# Patient Record
Sex: FEMALE | Race: WHITE | NOT HISPANIC OR LATINO
[De-identification: names, ages, dates, MRNs, and addresses within clinical notes are randomized per-mention and may not be internally consistent; named-entity substitution may affect disease eponyms.]

---

## 2021-03-09 PROBLEM — Z00.00 ENCOUNTER FOR PREVENTIVE HEALTH EXAMINATION: Status: ACTIVE | Noted: 2021-03-09

## 2021-03-17 ENCOUNTER — APPOINTMENT (OUTPATIENT)
Dept: ANTEPARTUM | Facility: CLINIC | Age: 26
End: 2021-03-17
Payer: COMMERCIAL

## 2021-03-17 ENCOUNTER — ASOB RESULT (OUTPATIENT)
Age: 26
End: 2021-03-17

## 2021-03-17 PROCEDURE — 99072 ADDL SUPL MATRL&STAF TM PHE: CPT

## 2021-03-17 PROCEDURE — 36416 COLLJ CAPILLARY BLOOD SPEC: CPT

## 2021-03-17 PROCEDURE — 76813 OB US NUCHAL MEAS 1 GEST: CPT

## 2021-03-17 PROCEDURE — 76801 OB US < 14 WKS SINGLE FETUS: CPT

## 2021-03-18 ENCOUNTER — TRANSCRIPTION ENCOUNTER (OUTPATIENT)
Age: 26
End: 2021-03-18

## 2021-03-25 ENCOUNTER — EMERGENCY (EMERGENCY)
Facility: HOSPITAL | Age: 26
LOS: 1 days | Discharge: HOME | End: 2021-03-25
Attending: EMERGENCY MEDICINE
Payer: COMMERCIAL

## 2021-03-25 VITALS
SYSTOLIC BLOOD PRESSURE: 144 MMHG | TEMPERATURE: 98 F | RESPIRATION RATE: 16 BRPM | OXYGEN SATURATION: 98 % | WEIGHT: 164.02 LBS | DIASTOLIC BLOOD PRESSURE: 81 MMHG | HEART RATE: 94 BPM

## 2021-03-25 LAB
ALBUMIN SERPL ELPH-MCNC: 3.6 G/DL — SIGNIFICANT CHANGE UP (ref 3.5–5)
ALLERGY+IMMUNOLOGY DIAG STUDY NOTE: SIGNIFICANT CHANGE UP
ALP SERPL-CCNC: 54 U/L — SIGNIFICANT CHANGE UP (ref 40–120)
ALT FLD-CCNC: 39 U/L DA — SIGNIFICANT CHANGE UP (ref 10–60)
ANION GAP SERPL CALC-SCNC: 10 MMOL/L — SIGNIFICANT CHANGE UP (ref 5–17)
APPEARANCE UR: CLEAR — SIGNIFICANT CHANGE UP
AST SERPL-CCNC: 24 U/L — SIGNIFICANT CHANGE UP (ref 10–40)
BACTERIA # UR AUTO: ABNORMAL /HPF
BILIRUB SERPL-MCNC: 0.5 MG/DL — SIGNIFICANT CHANGE UP (ref 0.2–1.2)
BILIRUB UR-MCNC: NEGATIVE — SIGNIFICANT CHANGE UP
BLD GP AB SCN SERPL QL: SIGNIFICANT CHANGE UP
BUN SERPL-MCNC: 6 MG/DL — LOW (ref 7–18)
CALCIUM SERPL-MCNC: 9.2 MG/DL — SIGNIFICANT CHANGE UP (ref 8.4–10.5)
CHLORIDE SERPL-SCNC: 104 MMOL/L — SIGNIFICANT CHANGE UP (ref 96–108)
CO2 SERPL-SCNC: 23 MMOL/L — SIGNIFICANT CHANGE UP (ref 22–31)
COLOR SPEC: YELLOW — SIGNIFICANT CHANGE UP
CREAT SERPL-MCNC: 0.47 MG/DL — LOW (ref 0.5–1.3)
DIFF PNL FLD: ABNORMAL
EPI CELLS # UR: SIGNIFICANT CHANGE UP /HPF
GLUCOSE SERPL-MCNC: 75 MG/DL — SIGNIFICANT CHANGE UP (ref 70–99)
GLUCOSE UR QL: NEGATIVE — SIGNIFICANT CHANGE UP
HCG SERPL-ACNC: HIGH MIU/ML
HCT VFR BLD CALC: 35 % — SIGNIFICANT CHANGE UP (ref 34.5–45)
HGB BLD-MCNC: 12 G/DL — SIGNIFICANT CHANGE UP (ref 11.5–15.5)
KETONES UR-MCNC: ABNORMAL
LEUKOCYTE ESTERASE UR-ACNC: NEGATIVE — SIGNIFICANT CHANGE UP
MCHC RBC-ENTMCNC: 28.2 PG — SIGNIFICANT CHANGE UP (ref 27–34)
MCHC RBC-ENTMCNC: 34.3 GM/DL — SIGNIFICANT CHANGE UP (ref 32–36)
MCV RBC AUTO: 82.4 FL — SIGNIFICANT CHANGE UP (ref 80–100)
NITRITE UR-MCNC: NEGATIVE — SIGNIFICANT CHANGE UP
NRBC # BLD: 0 /100 WBCS — SIGNIFICANT CHANGE UP (ref 0–0)
PH UR: 5 — SIGNIFICANT CHANGE UP (ref 5–8)
PLATELET # BLD AUTO: 201 K/UL — SIGNIFICANT CHANGE UP (ref 150–400)
POTASSIUM SERPL-MCNC: 4.1 MMOL/L — SIGNIFICANT CHANGE UP (ref 3.5–5.3)
POTASSIUM SERPL-SCNC: 4.1 MMOL/L — SIGNIFICANT CHANGE UP (ref 3.5–5.3)
PROT SERPL-MCNC: 7.2 G/DL — SIGNIFICANT CHANGE UP (ref 6–8.3)
PROT UR-MCNC: NEGATIVE — SIGNIFICANT CHANGE UP
RBC # BLD: 4.25 M/UL — SIGNIFICANT CHANGE UP (ref 3.8–5.2)
RBC # FLD: 14.1 % — SIGNIFICANT CHANGE UP (ref 10.3–14.5)
RBC CASTS # UR COMP ASSIST: ABNORMAL /HPF (ref 0–2)
SODIUM SERPL-SCNC: 137 MMOL/L — SIGNIFICANT CHANGE UP (ref 135–145)
SP GR SPEC: 1.02 — SIGNIFICANT CHANGE UP (ref 1.01–1.02)
UROBILINOGEN FLD QL: NEGATIVE — SIGNIFICANT CHANGE UP
WBC # BLD: 11.38 K/UL — HIGH (ref 3.8–10.5)
WBC # FLD AUTO: 11.38 K/UL — HIGH (ref 3.8–10.5)
WBC UR QL: SIGNIFICANT CHANGE UP /HPF (ref 0–5)

## 2021-03-25 PROCEDURE — 84702 CHORIONIC GONADOTROPIN TEST: CPT

## 2021-03-25 PROCEDURE — 76856 US EXAM PELVIC COMPLETE: CPT | Mod: 26

## 2021-03-25 PROCEDURE — 80053 COMPREHEN METABOLIC PANEL: CPT

## 2021-03-25 PROCEDURE — 86870 RBC ANTIBODY IDENTIFICATION: CPT

## 2021-03-25 PROCEDURE — 76856 US EXAM PELVIC COMPLETE: CPT

## 2021-03-25 PROCEDURE — 36415 COLL VENOUS BLD VENIPUNCTURE: CPT

## 2021-03-25 PROCEDURE — 87591 N.GONORRHOEAE DNA AMP PROB: CPT

## 2021-03-25 PROCEDURE — 86850 RBC ANTIBODY SCREEN: CPT

## 2021-03-25 PROCEDURE — 81001 URINALYSIS AUTO W/SCOPE: CPT

## 2021-03-25 PROCEDURE — 86900 BLOOD TYPING SEROLOGIC ABO: CPT

## 2021-03-25 PROCEDURE — 87491 CHLMYD TRACH DNA AMP PROBE: CPT

## 2021-03-25 PROCEDURE — 86901 BLOOD TYPING SEROLOGIC RH(D): CPT

## 2021-03-25 PROCEDURE — 76830 TRANSVAGINAL US NON-OB: CPT | Mod: 26

## 2021-03-25 PROCEDURE — 76830 TRANSVAGINAL US NON-OB: CPT

## 2021-03-25 PROCEDURE — 85027 COMPLETE CBC AUTOMATED: CPT

## 2021-03-25 PROCEDURE — 99284 EMERGENCY DEPT VISIT MOD MDM: CPT | Mod: 25

## 2021-03-25 PROCEDURE — 99285 EMERGENCY DEPT VISIT HI MDM: CPT

## 2021-03-25 NOTE — ED ADULT NURSE NOTE - OBJECTIVE STATEMENT
As per pt, c/o green vaginal discharge, foul urine odor, RLQ pain radiating laterally down the R leg and R side of lower back pain x1 week worsening today. Pt denies any traumas, h/a, SOB, CP, f/c, n/v/d, no vaginal bleeding, cough. Patient admits to being 14 weeks pregnant.

## 2021-03-25 NOTE — ED PROVIDER NOTE - OBJECTIVE STATEMENT
26 y/o F pt 14 weeks pregnant, sent to ED by GYN for increasing RLQ abdominal pain, 5-6/10 in severity, for the past 24hrs. Patient is also c/o burning on urination and green vaginal discharge. Denies fever, chills, diarrhea or any other complaints.

## 2021-03-25 NOTE — ED PROVIDER NOTE - CLINICAL SUMMARY MEDICAL DECISION MAKING FREE TEXT BOX
Pregnancy with RLQ abdominal pain. Will r/o appendicitis vs UTI vs PID. Will perform basic labs, type and cross, US and reevaluate. Pregnancy with RLQ abdominal pain. Will r/o appendicitis vs UTI vs PID. Will perform basic labs, type and cross, US and reevaluate.pt has appointment with gyn tommorow

## 2021-03-25 NOTE — ED ADULT TRIAGE NOTE - CHIEF COMPLAINT QUOTE
patient complains of RLQ pain x couple of days, patient is 14 week pregnant, patient denies vag bleed

## 2021-03-25 NOTE — ED PROVIDER NOTE - PATIENT PORTAL LINK FT
You can access the FollowMyHealth Patient Portal offered by [facility] by registering at the following website: [email or web address]. By joining [employer]’s FollowMyHealth portal, you will also be able to view your health information using other applications (apps) compatible with our system.

## 2021-03-26 LAB
C TRACH RRNA SPEC QL NAA+PROBE: SIGNIFICANT CHANGE UP
N GONORRHOEA RRNA SPEC QL NAA+PROBE: SIGNIFICANT CHANGE UP

## 2021-05-04 ENCOUNTER — EMERGENCY (EMERGENCY)
Facility: HOSPITAL | Age: 26
LOS: 1 days | Discharge: HOME | End: 2021-05-04
Attending: EMERGENCY MEDICINE
Payer: COMMERCIAL

## 2021-05-04 VITALS
TEMPERATURE: 98 F | HEART RATE: 66 BPM | DIASTOLIC BLOOD PRESSURE: 80 MMHG | RESPIRATION RATE: 20 BRPM | SYSTOLIC BLOOD PRESSURE: 115 MMHG | OXYGEN SATURATION: 100 %

## 2021-05-04 VITALS
SYSTOLIC BLOOD PRESSURE: 116 MMHG | HEART RATE: 83 BPM | RESPIRATION RATE: 16 BRPM | OXYGEN SATURATION: 100 % | TEMPERATURE: 97 F | DIASTOLIC BLOOD PRESSURE: 77 MMHG | WEIGHT: 160.06 LBS | HEIGHT: 65 IN

## 2021-05-04 LAB
ALLERGY+IMMUNOLOGY DIAG STUDY NOTE: SIGNIFICANT CHANGE UP
APPEARANCE UR: CLEAR — SIGNIFICANT CHANGE UP
BACTERIA # UR AUTO: ABNORMAL /HPF
BILIRUB UR-MCNC: NEGATIVE — SIGNIFICANT CHANGE UP
BLD GP AB SCN SERPL QL: SIGNIFICANT CHANGE UP
COLOR SPEC: YELLOW — SIGNIFICANT CHANGE UP
DIFF PNL FLD: ABNORMAL
EPI CELLS # UR: SIGNIFICANT CHANGE UP /HPF
GLUCOSE UR QL: NEGATIVE — SIGNIFICANT CHANGE UP
KETONES UR-MCNC: ABNORMAL
LEUKOCYTE ESTERASE UR-ACNC: ABNORMAL
NITRITE UR-MCNC: NEGATIVE — SIGNIFICANT CHANGE UP
PH UR: 7 — SIGNIFICANT CHANGE UP (ref 5–8)
PROT UR-MCNC: NEGATIVE — SIGNIFICANT CHANGE UP
RBC CASTS # UR COMP ASSIST: ABNORMAL /HPF (ref 0–2)
SP GR SPEC: 1 — LOW (ref 1.01–1.02)
UROBILINOGEN FLD QL: NEGATIVE — SIGNIFICANT CHANGE UP
WBC UR QL: SIGNIFICANT CHANGE UP /HPF (ref 0–5)

## 2021-05-04 PROCEDURE — 76805 OB US >/= 14 WKS SNGL FETUS: CPT

## 2021-05-04 PROCEDURE — 87086 URINE CULTURE/COLONY COUNT: CPT

## 2021-05-04 PROCEDURE — 86901 BLOOD TYPING SEROLOGIC RH(D): CPT

## 2021-05-04 PROCEDURE — 99284 EMERGENCY DEPT VISIT MOD MDM: CPT | Mod: 25

## 2021-05-04 PROCEDURE — 86850 RBC ANTIBODY SCREEN: CPT

## 2021-05-04 PROCEDURE — 76805 OB US >/= 14 WKS SNGL FETUS: CPT | Mod: 26

## 2021-05-04 PROCEDURE — 36415 COLL VENOUS BLD VENIPUNCTURE: CPT

## 2021-05-04 PROCEDURE — 99284 EMERGENCY DEPT VISIT MOD MDM: CPT

## 2021-05-04 PROCEDURE — 86870 RBC ANTIBODY IDENTIFICATION: CPT

## 2021-05-04 PROCEDURE — 86900 BLOOD TYPING SEROLOGIC ABO: CPT

## 2021-05-04 PROCEDURE — 81001 URINALYSIS AUTO W/SCOPE: CPT

## 2021-05-04 RX ORDER — SODIUM CHLORIDE 9 MG/ML
1000 INJECTION INTRAMUSCULAR; INTRAVENOUS; SUBCUTANEOUS ONCE
Refills: 0 | Status: DISCONTINUED | OUTPATIENT
Start: 2021-05-04 | End: 2021-05-04

## 2021-05-04 NOTE — ED PROVIDER NOTE - CLINICAL SUMMARY MEDICAL DECISION MAKING FREE TEXT BOX
26 y/o F pt presents to the ED with c/o 19w pregnancy and abd pain. Will assess for viability, cervical length, urine to assess for infection and reassess.

## 2021-05-04 NOTE — ED ADULT TRIAGE NOTE - CHIEF COMPLAINT QUOTE
19 Weeks pregnant with Lower abd pain since 2 pm today ,reports pain subsided now ,denied any vaginal bleeding

## 2021-05-04 NOTE — ED PROVIDER NOTE - ATTENDING CONTRIBUTION TO CARE
I was physically present for the E/M service provided. I agree with above history, physical, and plan which I have reviewed and edited where appropriate. I was physically present for the key portions of the service provided.    [place]: 24 y/o F patient 19w pregnant with no significant PMHx presents to the ED with c/o suprapubic cramping pain 1hr prior to arrival that is now resolved that was radiating to the lower back. denies any vag bleed, trauma    well appearing, abd s/nt/nd    a/p: r/o uti vs abd cramping due to pregnancy- labs, ua, sono, re-assess

## 2021-05-04 NOTE — ED PROVIDER NOTE - PATIENT PORTAL LINK FT
You can access the FollowMyHealth Patient Portal offered by [facility] by registering at the following website: [email or web address]. By joining [employer]’s FollowMyHealth portal, you will also be able to view your health information using other applications (apps) compatible with our system.

## 2021-05-04 NOTE — ED PROVIDER NOTE - NSFOLLOWUPINSTRUCTIONS_ED_ALL_ED_FT
Follow up with your OBGYN within 2-3 days.  If you experience any new or worsening symptoms or if you are concerned you can always come back to the emergency for a re-evaluation.

## 2021-05-04 NOTE — ED ADULT NURSE NOTE - NSIMPLEMENTINTERV_GEN_ALL_ED
Implemented All Universal Safety Interventions:  [place] to call system. Call bell, personal items and telephone within reach. Instruct patient to call for assistance. Room bathroom lighting operational. Non-slip footwear when patient is off stretcher. Physically safe environment: no spills, clutter or unnecessary equipment. Stretcher in lowest position, wheels locked, appropriate side rails in place.

## 2021-05-04 NOTE — ED PROVIDER NOTE - OBJECTIVE STATEMENT
24 y/o F patient 19w pregnant with no significant PMHx presents to the ED with c/o suprapubic cramping pain 1hr prior to arrival that is now resolved that was radiating to the lower back. Patient denies any recent injury, no unusual vaginal discharge, vaginal bleeding or any other complaints.

## 2021-05-04 NOTE — ED PROVIDER NOTE - PROGRESS NOTE DETAILS
UA shows blood, patient is Rh-, received Rhogam. US WNL. UA not suggestive of UTI. Culture sent. Will dc with OB follow up within 2-3 days. Pt is well appearing walking with steady gait, stable for discharge and follow up without fail with medical doctor. I had a detailed discussion with the patient and/or guardian regarding the historical points, exam findings, and any diagnostic results supporting the discharge diagnosis. Pt educated on care and need for follow up. Strict return instructions and red flag signs and symptoms discussed with patient. Questions answered. Pt shows understanding of discharge information and agrees to follow.

## 2021-05-05 LAB
CULTURE RESULTS: SIGNIFICANT CHANGE UP
SPECIMEN SOURCE: SIGNIFICANT CHANGE UP

## 2021-05-10 ENCOUNTER — OUTPATIENT (OUTPATIENT)
Dept: OUTPATIENT SERVICES | Facility: HOSPITAL | Age: 26
LOS: 1 days | End: 2021-05-10
Payer: COMMERCIAL

## 2021-05-10 DIAGNOSIS — Z3A.00 WEEKS OF GESTATION OF PREGNANCY NOT SPECIFIED: ICD-10-CM

## 2021-05-10 DIAGNOSIS — O26.899 OTHER SPECIFIED PREGNANCY RELATED CONDITIONS, UNSPECIFIED TRIMESTER: ICD-10-CM

## 2021-05-10 LAB
APPEARANCE UR: CLEAR — SIGNIFICANT CHANGE UP
BILIRUB UR-MCNC: NEGATIVE — SIGNIFICANT CHANGE UP
COLOR SPEC: SIGNIFICANT CHANGE UP
DIFF PNL FLD: NEGATIVE — SIGNIFICANT CHANGE UP
GLUCOSE UR QL: NEGATIVE — SIGNIFICANT CHANGE UP
KETONES UR-MCNC: NEGATIVE — SIGNIFICANT CHANGE UP
LEUKOCYTE ESTERASE UR-ACNC: NEGATIVE — SIGNIFICANT CHANGE UP
NITRITE UR-MCNC: NEGATIVE — SIGNIFICANT CHANGE UP
PH UR: 7 — SIGNIFICANT CHANGE UP (ref 5–8)
PROT UR-MCNC: NEGATIVE — SIGNIFICANT CHANGE UP
SP GR SPEC: 1.01 — SIGNIFICANT CHANGE UP (ref 1.01–1.02)
UROBILINOGEN FLD QL: NEGATIVE — SIGNIFICANT CHANGE UP

## 2021-05-10 PROCEDURE — 59025 FETAL NON-STRESS TEST: CPT

## 2021-05-10 PROCEDURE — G0463: CPT

## 2021-05-10 PROCEDURE — 76805 OB US >/= 14 WKS SNGL FETUS: CPT | Mod: 26

## 2021-05-10 PROCEDURE — 76815 OB US LIMITED FETUS(S): CPT

## 2021-05-10 PROCEDURE — 76830 TRANSVAGINAL US NON-OB: CPT

## 2021-05-10 PROCEDURE — 76817 TRANSVAGINAL US OBSTETRIC: CPT | Mod: 26

## 2021-05-10 PROCEDURE — 81003 URINALYSIS AUTO W/O SCOPE: CPT

## 2021-05-10 PROCEDURE — 99283 EMERGENCY DEPT VISIT LOW MDM: CPT

## 2021-05-13 ENCOUNTER — APPOINTMENT (OUTPATIENT)
Dept: ANTEPARTUM | Facility: CLINIC | Age: 26
End: 2021-05-13
Payer: COMMERCIAL

## 2021-05-13 PROCEDURE — 76811 OB US DETAILED SNGL FETUS: CPT

## 2021-05-13 PROCEDURE — 99072 ADDL SUPL MATRL&STAF TM PHE: CPT

## 2021-09-23 ENCOUNTER — OUTPATIENT (OUTPATIENT)
Dept: OUTPATIENT SERVICES | Facility: HOSPITAL | Age: 26
LOS: 1 days | End: 2021-09-23
Payer: COMMERCIAL

## 2021-09-23 DIAGNOSIS — O26.899 OTHER SPECIFIED PREGNANCY RELATED CONDITIONS, UNSPECIFIED TRIMESTER: ICD-10-CM

## 2021-09-23 DIAGNOSIS — Z3A.00 WEEKS OF GESTATION OF PREGNANCY NOT SPECIFIED: ICD-10-CM

## 2021-09-23 PROBLEM — Z78.9 OTHER SPECIFIED HEALTH STATUS: Chronic | Status: ACTIVE | Noted: 2021-05-04

## 2021-09-23 PROCEDURE — 59025 FETAL NON-STRESS TEST: CPT

## 2021-09-23 PROCEDURE — G0463: CPT

## 2021-09-24 ENCOUNTER — INPATIENT (INPATIENT)
Facility: HOSPITAL | Age: 26
LOS: 1 days | Discharge: HOME | End: 2021-09-26
Attending: OBSTETRICS & GYNECOLOGY | Admitting: OBSTETRICS & GYNECOLOGY
Payer: COMMERCIAL

## 2021-09-24 VITALS — WEIGHT: 179.9 LBS | HEIGHT: 65 IN

## 2021-09-24 DIAGNOSIS — Z34.80 ENCOUNTER FOR SUPERVISION OF OTHER NORMAL PREGNANCY, UNSPECIFIED TRIMESTER: ICD-10-CM

## 2021-09-24 DIAGNOSIS — O26.899 OTHER SPECIFIED PREGNANCY RELATED CONDITIONS, UNSPECIFIED TRIMESTER: ICD-10-CM

## 2021-09-24 DIAGNOSIS — Z3A.00 WEEKS OF GESTATION OF PREGNANCY NOT SPECIFIED: ICD-10-CM

## 2021-09-24 LAB
ALLERGY+IMMUNOLOGY DIAG STUDY NOTE: SIGNIFICANT CHANGE UP
APTT BLD: 29.5 SEC — SIGNIFICANT CHANGE UP (ref 27.5–35.5)
BASOPHILS # BLD AUTO: 0.03 K/UL — SIGNIFICANT CHANGE UP (ref 0–0.2)
BASOPHILS NFR BLD AUTO: 0.2 % — SIGNIFICANT CHANGE UP (ref 0–2)
BLD GP AB SCN SERPL QL: SIGNIFICANT CHANGE UP
EOSINOPHIL # BLD AUTO: 0 K/UL — SIGNIFICANT CHANGE UP (ref 0–0.5)
EOSINOPHIL NFR BLD AUTO: 0 % — SIGNIFICANT CHANGE UP (ref 0–6)
FIBRINOGEN PPP-MCNC: 772 MG/DL — HIGH (ref 290–520)
HCT VFR BLD CALC: 38.3 % — SIGNIFICANT CHANGE UP (ref 34.5–45)
HGB BLD-MCNC: 12.8 G/DL — SIGNIFICANT CHANGE UP (ref 11.5–15.5)
IMM GRANULOCYTES NFR BLD AUTO: 0.6 % — SIGNIFICANT CHANGE UP (ref 0–1.5)
INR BLD: 0.9 RATIO — SIGNIFICANT CHANGE UP (ref 0.88–1.16)
LYMPHOCYTES # BLD AUTO: 1.22 K/UL — SIGNIFICANT CHANGE UP (ref 1–3.3)
LYMPHOCYTES # BLD AUTO: 8.1 % — LOW (ref 13–44)
MCHC RBC-ENTMCNC: 28.1 PG — SIGNIFICANT CHANGE UP (ref 27–34)
MCHC RBC-ENTMCNC: 33.4 GM/DL — SIGNIFICANT CHANGE UP (ref 32–36)
MCV RBC AUTO: 84.2 FL — SIGNIFICANT CHANGE UP (ref 80–100)
MONOCYTES # BLD AUTO: 0.71 K/UL — SIGNIFICANT CHANGE UP (ref 0–0.9)
MONOCYTES NFR BLD AUTO: 4.7 % — SIGNIFICANT CHANGE UP (ref 2–14)
NEUTROPHILS # BLD AUTO: 13 K/UL — HIGH (ref 1.8–7.4)
NEUTROPHILS NFR BLD AUTO: 86.4 % — HIGH (ref 43–77)
NRBC # BLD: 0 /100 WBCS — SIGNIFICANT CHANGE UP (ref 0–0)
PLATELET # BLD AUTO: 188 K/UL — SIGNIFICANT CHANGE UP (ref 150–400)
PROTHROM AB SERPL-ACNC: 10.8 SEC — SIGNIFICANT CHANGE UP (ref 10.6–13.6)
RBC # BLD: 4.55 M/UL — SIGNIFICANT CHANGE UP (ref 3.8–5.2)
RBC # FLD: 15.3 % — HIGH (ref 10.3–14.5)
SARS-COV-2 RNA SPEC QL NAA+PROBE: SIGNIFICANT CHANGE UP
T PALLIDUM AB TITR SER: NEGATIVE — SIGNIFICANT CHANGE UP
WBC # BLD: 15.05 K/UL — HIGH (ref 3.8–10.5)
WBC # FLD AUTO: 15.05 K/UL — HIGH (ref 3.8–10.5)

## 2021-09-24 RX ORDER — DIPHENHYDRAMINE HCL 50 MG
25 CAPSULE ORAL EVERY 6 HOURS
Refills: 0 | Status: DISCONTINUED | OUTPATIENT
Start: 2021-09-24 | End: 2021-09-26

## 2021-09-24 RX ORDER — BENZOCAINE 10 %
1 GEL (GRAM) MUCOUS MEMBRANE EVERY 6 HOURS
Refills: 0 | Status: DISCONTINUED | OUTPATIENT
Start: 2021-09-24 | End: 2021-09-26

## 2021-09-24 RX ORDER — OXYTOCIN 10 UNIT/ML
333.33 VIAL (ML) INJECTION
Qty: 20 | Refills: 0 | Status: DISCONTINUED | OUTPATIENT
Start: 2021-09-24 | End: 2021-09-26

## 2021-09-24 RX ORDER — FERROUS SULFATE 325(65) MG
325 TABLET ORAL DAILY
Refills: 0 | Status: DISCONTINUED | OUTPATIENT
Start: 2021-09-24 | End: 2021-09-26

## 2021-09-24 RX ORDER — OXYCODONE HYDROCHLORIDE 5 MG/1
5 TABLET ORAL
Refills: 0 | Status: DISCONTINUED | OUTPATIENT
Start: 2021-09-24 | End: 2021-09-26

## 2021-09-24 RX ORDER — SODIUM CHLORIDE 9 MG/ML
1000 INJECTION, SOLUTION INTRAVENOUS
Refills: 0 | Status: DISCONTINUED | OUTPATIENT
Start: 2021-09-24 | End: 2021-09-24

## 2021-09-24 RX ORDER — AER TRAVELER 0.5 G/1
1 SOLUTION RECTAL; TOPICAL EVERY 4 HOURS
Refills: 0 | Status: DISCONTINUED | OUTPATIENT
Start: 2021-09-24 | End: 2021-09-26

## 2021-09-24 RX ORDER — LANOLIN
1 OINTMENT (GRAM) TOPICAL EVERY 6 HOURS
Refills: 0 | Status: DISCONTINUED | OUTPATIENT
Start: 2021-09-24 | End: 2021-09-26

## 2021-09-24 RX ORDER — OXYTOCIN 10 UNIT/ML
2 VIAL (ML) INJECTION
Qty: 30 | Refills: 0 | Status: DISCONTINUED | OUTPATIENT
Start: 2021-09-24 | End: 2021-09-26

## 2021-09-24 RX ORDER — KETOROLAC TROMETHAMINE 30 MG/ML
30 SYRINGE (ML) INJECTION ONCE
Refills: 0 | Status: DISCONTINUED | OUTPATIENT
Start: 2021-09-24 | End: 2021-09-26

## 2021-09-24 RX ORDER — PRAMOXINE HYDROCHLORIDE 150 MG/15G
1 AEROSOL, FOAM RECTAL EVERY 4 HOURS
Refills: 0 | Status: DISCONTINUED | OUTPATIENT
Start: 2021-09-24 | End: 2021-09-26

## 2021-09-24 RX ORDER — OXYTOCIN 10 UNIT/ML
333.33 VIAL (ML) INJECTION
Qty: 20 | Refills: 0 | Status: DISCONTINUED | OUTPATIENT
Start: 2021-09-24 | End: 2021-09-24

## 2021-09-24 RX ORDER — ASCORBIC ACID 60 MG
500 TABLET,CHEWABLE ORAL DAILY
Refills: 0 | Status: DISCONTINUED | OUTPATIENT
Start: 2021-09-24 | End: 2021-09-26

## 2021-09-24 RX ORDER — CITRIC ACID/SODIUM CITRATE 300-500 MG
15 SOLUTION, ORAL ORAL EVERY 6 HOURS
Refills: 0 | Status: DISCONTINUED | OUTPATIENT
Start: 2021-09-24 | End: 2021-09-24

## 2021-09-24 RX ORDER — TETANUS TOXOID, REDUCED DIPHTHERIA TOXOID AND ACELLULAR PERTUSSIS VACCINE, ADSORBED 5; 2.5; 8; 8; 2.5 [IU]/.5ML; [IU]/.5ML; UG/.5ML; UG/.5ML; UG/.5ML
0.5 SUSPENSION INTRAMUSCULAR ONCE
Refills: 0 | Status: DISCONTINUED | OUTPATIENT
Start: 2021-09-24 | End: 2021-09-26

## 2021-09-24 RX ORDER — MAGNESIUM HYDROXIDE 400 MG/1
30 TABLET, CHEWABLE ORAL
Refills: 0 | Status: DISCONTINUED | OUTPATIENT
Start: 2021-09-24 | End: 2021-09-26

## 2021-09-24 RX ORDER — DIBUCAINE 1 %
1 OINTMENT (GRAM) RECTAL EVERY 6 HOURS
Refills: 0 | Status: DISCONTINUED | OUTPATIENT
Start: 2021-09-24 | End: 2021-09-26

## 2021-09-24 RX ORDER — IBUPROFEN 200 MG
600 TABLET ORAL EVERY 6 HOURS
Refills: 0 | Status: COMPLETED | OUTPATIENT
Start: 2021-09-24 | End: 2022-08-23

## 2021-09-24 RX ORDER — SIMETHICONE 80 MG/1
80 TABLET, CHEWABLE ORAL EVERY 4 HOURS
Refills: 0 | Status: DISCONTINUED | OUTPATIENT
Start: 2021-09-24 | End: 2021-09-26

## 2021-09-24 RX ORDER — SODIUM CHLORIDE 9 MG/ML
3 INJECTION INTRAMUSCULAR; INTRAVENOUS; SUBCUTANEOUS EVERY 8 HOURS
Refills: 0 | Status: DISCONTINUED | OUTPATIENT
Start: 2021-09-24 | End: 2021-09-26

## 2021-09-24 RX ORDER — ACETAMINOPHEN 500 MG
975 TABLET ORAL EVERY 6 HOURS
Refills: 0 | Status: DISCONTINUED | OUTPATIENT
Start: 2021-09-24 | End: 2021-09-26

## 2021-09-24 RX ORDER — HYDROCORTISONE 1 %
1 OINTMENT (GRAM) TOPICAL EVERY 6 HOURS
Refills: 0 | Status: DISCONTINUED | OUTPATIENT
Start: 2021-09-24 | End: 2021-09-26

## 2021-09-24 RX ADMIN — SODIUM CHLORIDE 125 MILLILITER(S): 9 INJECTION, SOLUTION INTRAVENOUS at 08:35

## 2021-09-24 RX ADMIN — Medication 1000 MILLIUNIT(S)/MIN: at 16:38

## 2021-09-24 RX ADMIN — SODIUM CHLORIDE 125 MILLILITER(S): 9 INJECTION, SOLUTION INTRAVENOUS at 04:27

## 2021-09-24 RX ADMIN — Medication 2 MILLIUNIT(S)/MIN: at 09:20

## 2021-09-24 RX ADMIN — SODIUM CHLORIDE 125 MILLILITER(S): 9 INJECTION, SOLUTION INTRAVENOUS at 03:07

## 2021-09-24 NOTE — PATIENT PROFILE OB - AMNIOTIC FLUID AMOUNT, LABOR
Patient Education     Understanding Advance Care Planning    Advance care planning is the process of deciding one’s own future medical care. It helps to make sure that if you can’t speak for yourself, your wishes can still be carried out. The plan is a series of legal documents that note a person’s wishes. The documents vary by state. Advance care planning should be discussed at a regular office visit with your primary care provider before an acute illness. Advance care planning is encouraged when a person has a serious illness that is expected to get worse. It may also be done before major surgery. And it can help you and your family be prepared in case of a major illness or injury. Advance care planning helps with making decisions at these times.  A healthcare proxy is a person who acts as the voice of a patient when the patient can’t speak for himself or herself. The name of this role varies by state. It may be called a Durable Medical Power of  or Durable Power of  for Healthcare. It may be called an agent, surrogate, or advocate. Or it may be called a representative or decision maker. It is an official duty that is identified by a legal document. The document also varies by state.   Why is advance care planning important?  If a person communicates his or her healthcare wishes:  · He or she will be given medical care that matches his or her values and goals.  · Family members will not be forced to make decisions in a crisis with no guidance.  Creating a plan  Making an advance care plan is often done in 3 steps:  · Thinking about one’s wishes. To create an advance care plan, you should think about what kind of medical treatment you would want if you lose the ability to communicate. Are there any situations in which you would refuse or stop treatment? Are there therapies you would want or not want? And whom do you want to make decisions for you? There are many places to learn more about how to  plan for your care. Ask your healthcare provider or  for resources.  · Picking a healthcare proxy. This means choosing a trusted person to speak for you only when you can’t speak for yourself. When you cannot make medical decisions, your proxy makes sure the instructions in your advance care plan are followed. A proxy does not make decisions based on his or her own opinions. They must put aside those opinions and values if needed, and carry out your wishes.  · Filling out the legal documents. There are several kinds of legal documents for advance care planning. Each one tells healthcare providers your wishes. The documents may vary by state. They must be signed and may need to be witnessed or notarized. You can cancel or change them whenever you wish. Depending on your state, the documents may include a Healthcare Proxy form, Living Will, Durable Medical Power of , Advance Directive, or others.  The family’s role  The best help a family can give is to support their loved one’s wishes. Open and honest communication is vital. Family should express any concerns they have about the patient’s choices while the patient can still make decisions in the event that his or her illness prevents him or her from communicating those wishes at a later time.   Date Last Reviewed: 4/1/2017  © 0215-1201 [employer]. [address] [place] [address]. All rights reserved. This information is not intended as a substitute for professional medical care. Always follow your healthcare professional's instructions.    To complete your Advance Directive/Power of  for Healthcare document, our Advance Directive Facilitator for [facility], Erika Reynoso, is available by appointment Mon/Tue/Thu from 9:00 am - 3:00 pm. Please call her at [phone] to schedule an appointment, or contact via email: [email or web address]”.           Medicare Wellness Visit  Plan for  Preventive Care    A good way for you to stay healthy is to use preventive care.  Medicare covers many services that can help you stay healthy.* The goal of these services is to find any health problems as quickly as possible. Finding problems early can help make them easier to treat.  Your personal plan below lists the services you may need and when they are due.     Health Maintenance Summary     Diabetes Eye Exam (Yearly)  Overdue - never done    Hepatitis C Screening (Once)  Overdue - never done    Abdominal Aortic Aneurysm (AAA) Screening (Once)  Overdue - never done    Diabetes Foot Exam (Yearly)  Due since 6/11/2021    DTaP/Tdap/Td Vaccine (2 - Td or Tdap)  Next due on 6/28/2021    Diabetes A1C (Every 6 Months)  Next due on 12/14/2021    DM/CKD GFR (Yearly)  Next due on 6/14/2022    Depression Screening (Yearly)  Next due on 6/15/2022    Medicare Wellness Visit (Yearly)  Next due on 6/17/2022    Colorectal Cancer Screen-   Next due on 10/21/2023    Pneumococcal Vaccine 65+   Completed    Shingles Vaccine   Completed    Influenza Vaccine   Completed    COVID-19 Vaccine   Completed    Hepatitis B Vaccine   Aged Out    Meningococcal Vaccine   Aged Out    HPV Vaccine   Aged Out           Preventive Care for Women and Men    Heart Screenings (Cardiovascular):  · Blood tests are used to check your cholesterol, lipid and triglyceride levels. High levels can increase your risk for heart disease and stroke. High levels can be treated with medications, diet and exercise. Lowering your levels can help keep your heart and blood vessels healthy.  Your provider will order these tests if they are needed.    · An ultrasound is done to see if you have an abdominal aortic aneurysm (AAA).  This is an enlargement of one of the main blood vessels that delivers blood to the body.   In the [place], 9,000 deaths are caused by AAA.  You may not even know you have this problem and as many as 1 in 3 people will have a serious  problem if it is not treated.  Early diagnosis allows for more effective treatment and cure.  If you have a family history of AAA or are a male age 65-75 who has smoked, you are at higher risk of an AAA.  Your provider can order this test, if needed.    Colorectal Screening:  · There are many tests that are used to check for cancer of your colon and rectum. You and your provider should discuss what test is best for you and when to have it done.  Options include:  · Screening Colonoscopy: exam of the entire colon, seen through a flexible lighted tube.  · Flexible Sigmoidoscopy: exam of the last third (sigmoid portion) of the colon and rectum, seen through a flexible lighted tube.  · Cologuard DNA stool test: a sample of your stool is used to screen for cancer and unseen blood in your stool.  · Fecal Occult Blood Test: a sample of your stool is studied to find any unseen blood    Flu Shot:  · An immunization that helps to prevent influenza (the flu). You should get this every year. The best time to get the shot is in the fall.    Pneumococcal Shot:  • Vaccines are available that can help prevent pneumococcal disease, which is any type of infection caused by Streptococcus pneumoniae bacteria.   Their use can prevent some cases of pneumonia, meningitis, and sepsis. There are two types of pneumococcal vaccines:   o Conjugate vaccines (PCV-13 or Prevnar 13®) - helps protect against the 13 types of pneumococcal bacteria that are the most common causes of serious infections in children and adults.    o Polysaccharide vaccine (PPSV23 or Ecmdefloq44®) - helps protect against 23 types of pneumococcal bacteria for patients who are recommended to get it.  These vaccines should be given at least 12 months apart.  A booster is usually not needed.     Hepatitis B Shot:  · An immunization that helps to protect people from getting Hepatitis B. Hepatitis B is a virus that spreads through contact with infected blood or body fluids.  Many people with the virus do not have symptoms.  The virus can lead to serious problems, such as liver disease. Some people are at higher risk than others. Your doctor will tell you if you need this shot.     Diabetes Screening:  · A test to measure sugar (glucose) in your blood is called a fasting blood sugar. Fasting means you cannot have food or drink for at least 8 hours before the test. This test can detect diabetes long before you may notice symptoms.    Glaucoma Screening:  · Glaucoma screening is performed by your eye doctor. The test measures the fluid pressure inside your eyes to determine if you have glaucoma.     Hepatitis C Screening:  · A blood test to see if you have the hepatitis C virus.  Hepatitis C attacks the liver and is a major cause of chronic liver disease.  Medicare will cover a single screening for all adults born between 1945 & 1965, or high risk patients (people who have injected illegal drugs or people who have had blood transfusions).  High risk patients who continue to inject illegal drugs can be screened for Hepatitis C every year.    Smoking and Tobacco-Use Cessation Counseling:  · Tobacco is the single greatest cause of disease and early death in our country today. Medication and counseling together can increase a person’s chance of quitting for good.   · Medicare covers two quitting attempts per year, with four counseling sessions per attempt (eight sessions in a 12 month period)    Preventive Screening tests for Women    Screening Mammograms and Breast Exams:  · An x-ray of your breasts to check for breast cancer before you or your doctor may be able to feel it.  If breast cancer is found early it can usually be treated with success.    Pelvic Exams and Pap Tests:  · An exam to check for cervical and vaginal cancer. A Pap test is a lab test in which cells are taken from your cervix and sent to the lab to look for signs of cervical cancer. If cancer of the cervix is found early,  chances for a cure are good. Testing can generally end at age 65, or if a woman has a hysterectomy for a benign condition. Your provider may recommend more frequent testing if certain abnormal results are found.    Bone Mass Measurements:  · A painless x-ray of your bone density to see if you are at risk for a broken bone. Bone density refers to the thickness of bones or how tightly the bone tissue is packed.    Preventive Screening tests for Men    Prostate Screening:  · Should you have a prostate cancer test (PSA)?  It is up to you to decide if you want a prostate cancer test. Talk to your clinician to find out if the test is right for you.  Things for you to consider and talk about should include:  · Benefits and harms of the test  · Your family history  · How your race/ethnicity may influence the test  · If the test may impact other medical conditions you have  · Your values on screenings and treatments    *Medicare pays for many preventive services to keep you healthy. For some of these services, you might have to pay a deductible, coinsurance, and / or copayment.  The amounts vary depending on the type of services you need and the kind of Medicare health plan you have.    For further details on screenings offered by Medicare please visit: https://www.medicare.gov/coverage/preventive-screening-services    within normal limits

## 2021-09-25 ENCOUNTER — TRANSCRIPTION ENCOUNTER (OUTPATIENT)
Age: 26
End: 2021-09-25

## 2021-09-25 LAB
BASOPHILS # BLD AUTO: 0.06 K/UL — SIGNIFICANT CHANGE UP (ref 0–0.2)
BASOPHILS NFR BLD AUTO: 0.4 % — SIGNIFICANT CHANGE UP (ref 0–2)
EOSINOPHIL # BLD AUTO: 0.06 K/UL — SIGNIFICANT CHANGE UP (ref 0–0.5)
EOSINOPHIL NFR BLD AUTO: 0.4 % — SIGNIFICANT CHANGE UP (ref 0–6)
FETAL SCREEN: SIGNIFICANT CHANGE UP
HCT VFR BLD CALC: 30.3 % — LOW (ref 34.5–45)
HGB BLD-MCNC: 9.9 G/DL — LOW (ref 11.5–15.5)
IMM GRANULOCYTES NFR BLD AUTO: 0.7 % — SIGNIFICANT CHANGE UP (ref 0–1.5)
LYMPHOCYTES # BLD AUTO: 1.5 K/UL — SIGNIFICANT CHANGE UP (ref 1–3.3)
LYMPHOCYTES # BLD AUTO: 9 % — LOW (ref 13–44)
MCHC RBC-ENTMCNC: 28.1 PG — SIGNIFICANT CHANGE UP (ref 27–34)
MCHC RBC-ENTMCNC: 32.7 GM/DL — SIGNIFICANT CHANGE UP (ref 32–36)
MCV RBC AUTO: 86.1 FL — SIGNIFICANT CHANGE UP (ref 80–100)
MONOCYTES # BLD AUTO: 1.07 K/UL — HIGH (ref 0–0.9)
MONOCYTES NFR BLD AUTO: 6.5 % — SIGNIFICANT CHANGE UP (ref 2–14)
NEUTROPHILS # BLD AUTO: 13.78 K/UL — HIGH (ref 1.8–7.4)
NEUTROPHILS NFR BLD AUTO: 83 % — HIGH (ref 43–77)
NRBC # BLD: 0 /100 WBCS — SIGNIFICANT CHANGE UP (ref 0–0)
PLATELET # BLD AUTO: 149 K/UL — LOW (ref 150–400)
RBC # BLD: 3.52 M/UL — LOW (ref 3.8–5.2)
RBC # FLD: 15.7 % — HIGH (ref 10.3–14.5)
WBC # BLD: 16.58 K/UL — HIGH (ref 3.8–10.5)
WBC # FLD AUTO: 16.58 K/UL — HIGH (ref 3.8–10.5)

## 2021-09-25 RX ORDER — IBUPROFEN 200 MG
600 TABLET ORAL EVERY 6 HOURS
Refills: 0 | Status: DISCONTINUED | OUTPATIENT
Start: 2021-09-25 | End: 2021-09-26

## 2021-09-25 RX ADMIN — Medication 600 MILLIGRAM(S): at 08:37

## 2021-09-25 RX ADMIN — Medication 500 MILLIGRAM(S): at 11:48

## 2021-09-25 RX ADMIN — Medication 600 MILLIGRAM(S): at 09:07

## 2021-09-25 RX ADMIN — SODIUM CHLORIDE 3 MILLILITER(S): 9 INJECTION INTRAMUSCULAR; INTRAVENOUS; SUBCUTANEOUS at 05:09

## 2021-09-25 RX ADMIN — Medication 325 MILLIGRAM(S): at 11:48

## 2021-09-25 RX ADMIN — Medication 1 TABLET(S): at 11:47

## 2021-09-25 RX ADMIN — Medication 600 MILLIGRAM(S): at 20:03

## 2021-09-25 RX ADMIN — MAGNESIUM HYDROXIDE 30 MILLILITER(S): 400 TABLET, CHEWABLE ORAL at 11:48

## 2021-09-25 RX ADMIN — Medication 600 MILLIGRAM(S): at 21:00

## 2021-09-25 NOTE — DISCHARGE NOTE OB - MEDICATION SUMMARY - MEDICATIONS TO TAKE
August 4, 2021         No Recipients      Patient: Adriel Hickey   YOB: 1942   Date of Visit: 7/8/2021       Dear Dr. Fenton Recipients:    Thank you for referring Adriel Hickey to me for evaluation. Below are my notes for this visit with him.    If you have questions, please do not hesitate to call me. I look forward to following your patient along with you.      Sincerely,        Randa Almanza MD        CC:   No Recipients  Randa Almanza MD  8/4/2021 10:07 AM  Sign when Signing Visit      [facility]  Randa Almanza M.D.    [facility], [address], [facility]  [address], [place] [address]  [address] [place] [address] ,  [id number]  # [phone]  Fax #: [phone]        Cc: Sarcoma; post resection, adjuvant XRT completed  Restaging scans as below    History Of Present Illness  Adriel is a 79 year old male  with past medical history including CAD, hypertension, hyperlipidemia, GERD who presented to the hospital end of January with complaints of diarrhea and hematochezia.  Additional evaluation included CT abdomen that demonstrated Large left retroperitoneal mass (9.3 cm) with possible left posteriorlateral abdominal wall invasion as above. Biopsy was obtained. Pathology demonstrated pleomorphic neoplasm favoring sarcoma, additional classification not possible, as detailed below. Colonoscopy was reportedly negative. He underwent resection on 3/10 and has recovered remarkably well. 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 Vimentin, CD34 and desmin (focal) immunopositive and cytokeratin (CAM 5.2 and AE1/AE3/PCK26), , SMA, S100 and myogenin immunonegative and has a high (90%) proliferation index by Ki-67 immunohistochemical stain.  FISH studies showed no MDM2 amplification. He received  adjuvant radiation under care of Dr Carrion. Restaging scans 4 weeks post completion were negative as detailed below. He presented today for [facility].    He has no specific concerns today. No nausea,vomiting, diarrhea, abdominal pain, cough, chest pain,  shortness of breath. His appetite is fair and he reports having good energy level.    ECOG PS 1-2      Current Outpatient Medications   Medication Sig Dispense Refill   • triamterene-hydroCHLOROthiazide (MAXZIDE-25) 37.5-25 MG per tablet TAKE 1/2 TABLET BY MOUTH DAILY 45 tablet 2   • amlodipine-benazepril (LOTREL) 10-20 MG per capsule TAKE ONE CAPSULE BY MOUTH DAILY 90 capsule 3   • atorvastatin (LIPITOR) 20 MG tablet Take 1 tablet by mouth 3 days a week. Monday, Wednesday & Friday. 36 tablet 3   • metoPROLOL tartrate (LOPRESSOR) 25 MG tablet Take 0.5 tablets by mouth 2 times daily. 90 tablet 3   • Multiple Vitamins-Minerals (ICAPS AREDS 2 PO) Take 1 capsule by mouth 2 times daily.      • aspirin 81 MG tablet Take 1 tablet by mouth daily. 1 tablet Orally Once a day      • omeprazole (PRILOSEC) 20 MG capsule Take 1 capsule by mouth daily. 1 capsule Orally Once a day      • Multiple Vitamin (MULTI-VITAMIN) tablet Take 1 tablet by mouth daily.        No current facility-administered medications for this visit.     REVIEW OF SYSTEMS:  GENERAL: No fevers, chills, night sweats, change in weight or appetite, mild fatigue.  HEENT: No vision changes. Has hearing aid. No difficulty in swallowing.   LUNGS: No chronic cough or chest pain.  HEART: No shortness of breath, palpitations, chest pain.  GI: No nausea, vomiting, diarrhea.  : No urinary complaints.   SKIN: No skin lesions. No unexplained bruises or rash.   MUSCULOSKELETAL: Had polio with associated right leg weakness. No new bone and joint pains.  NEUROLOGIC: No headache, dizziness, tingling, numbness or weakness.     PHYSICAL EXAM:  Blood pressure 133/61, pulse 64, temperature 98 °F (36.7 °C), resp. rate 14,  weight 61.2 kg (134 lb 14.7 oz), SpO2 97 %.   GENERAL: well appearing in no acute distress.  HEENT: No pallor. No icterus  CHEST: clear to auscultation and resonant on percussion.  CVS: S1S2 present, regular rate and rhythm. No murmur or gallop.  ABDOMEN: Soft, non-distended. Post operative changes. BS+  EXTREMITIES: smaller extremity c/w h/o polio 1+ edema. Pulses palpable.  NEUROLOGIC: Cranial nerves II-XII intact. No motor or sensory deficits noted.     DATA REVIEW:  WBC   Date Value Ref Range Status   07/02/2021 7.6 4.2 - 11.0 K/mcL Final     RBC   Date Value Ref Range Status   07/02/2021 3.97 (L) 4.50 - 5.90 mil/mcL Final     HGB   Date Value Ref Range Status   07/02/2021 12.6 (L) 13.0 - 17.0 g/dL Final     HCT   Date Value Ref Range Status   07/02/2021 38.3 (L) 39.0 - 51.0 % Final     MCV   Date Value Ref Range Status   07/02/2021 96.5 78.0 - 100.0 fl Final     MCH   Date Value Ref Range Status   07/02/2021 31.7 26.0 - 34.0 pg Final     MCHC   Date Value Ref Range Status   07/02/2021 32.9 32.0 - 36.5 g/dL Final     RDW-CV   Date Value Ref Range Status   07/02/2021 14.6 11.0 - 15.0 % Final     PLT   Date Value Ref Range Status   07/02/2021 162 140 - 450 K/mcL Final     NRBC   Date Value Ref Range Status   07/02/2021 0 <=0 /100 WBC Final     Neutrophil, Percent   Date Value Ref Range Status   05/27/2021 74 % Final     Lymphocytes, Percent   Date Value Ref Range Status   05/27/2021 5 % Final     Mono, Percent   Date Value Ref Range Status   05/27/2021 11 % Final     Eosinophils, Percent   Date Value Ref Range Status   07/02/2021 17 % Final     Basophils, Percent   Date Value Ref Range Status   05/27/2021 1 % Final     Absolute Neutrophils   Date Value Ref Range Status   05/27/2021 5.4 1.8 - 7.7 K/mcL Final     Absolute Lymphocytes   Date Value Ref Range Status   05/27/2021 0.4 (L) 1.0 - 4.0 K/mcL Final     Absolute Monocytes   Date Value Ref Range Status   05/27/2021 0.8 0.3 - 0.9 K/mcL Final     Absolute  Eosinophils    Date Value Ref Range Status   05/27/2021 0.6 (H) 0.0 - 0.5 K/mcL Final     Absolute Basophils   Date Value Ref Range Status   05/27/2021 0.1 0.0 - 0.3 K/mcL Final     Fasting Status   Date Value Ref Range Status   08/13/2020 UNKNOWN hrs Final     Sodium   Date Value Ref Range Status   07/02/2021 146 (H) 135 - 145 mmol/L Final     Potassium   Date Value Ref Range Status   07/02/2021 4.7 3.4 - 5.1 mmol/L Final     Chloride   Date Value Ref Range Status   07/02/2021 109 (H) 98 - 107 mmol/L Final     Carbon Dioxide   Date Value Ref Range Status   07/02/2021 27 21 - 32 mmol/L Final     Anion Gap   Date Value Ref Range Status   07/02/2021 15 10 - 20 mmol/L Final     Glucose   Date Value Ref Range Status   07/02/2021 93 65 - 99 mg/dL Final     BUN   Date Value Ref Range Status   07/02/2021 15 6 - 20 mg/dL Final     Creatinine   Date Value Ref Range Status   07/02/2021 0.77 0.67 - 1.17 mg/dL Final     GFR Estimate,    Date Value Ref Range Status   08/13/2020 >90  Final     Comment:     eGFR results = or >90 mL/min/1.73m2 = Normal kidney function.     GFR Estimate, Non    Date Value Ref Range Status   08/13/2020 89  Final     Comment:     eGFR 60 - 89 mL/min/1.73m2 = Mild decrease in kidney function.     BUN/ Creatinine Ratio   Date Value Ref Range Status   07/02/2021 19 7 - 25 Final     Calcium   Date Value Ref Range Status   07/02/2021 9.2 8.4 - 10.2 mg/dL Final     Bilirubin, Total   Date Value Ref Range Status   07/02/2021 0.9 0.2 - 1.0 mg/dL Final     GOT/AST   Date Value Ref Range Status   07/02/2021 21 <=37 Units/L Final     GPT/ALT   Date Value Ref Range Status   07/02/2021 23 <64 Units/L Final     Alkaline Phosphatase   Date Value Ref Range Status   07/02/2021 119 (H) 45 - 117 Units/L Final     Protein, Total   Date Value Ref Range Status   07/02/2021 6.3 (L) 6.4 - 8.2 g/dL Final     Albumin   Date Value Ref Range Status   07/02/2021 3.6 3.6 - 5.1 g/dL Final      Globulin   Date Value Ref Range Status   07/02/2021 2.7 2.0 - 4.0 g/dL Final     A/G Ratio   Date Value Ref Range Status   07/02/2021 1.3 1.0 - 2.4 Final     IMPRESSION:     CT chest:  1.  No findings to suggest metastatic disease in the thorax.  2.  Mild centrilobular emphysema.  3.  Additional findings, as above.     CT abdomen/pelvis:  1.  Evolving postsurgical changes in the left retroperitoneum for resection  of previously demonstrated mass.  No findings to suggest recurrent or  residual mass in the resection bed.  Continued follow-up is recommended.  2.  Prostatomegaly protruding into the base of the urinary bladder.  3.  Nonobstructing right renal calculus.  4.  Colonic diverticulosis.  5.  Additional findings, as above.     Electronically Signed by: MELECIO REESE M.D.   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 Post completion ofadjuvant RT. I discussed adjuvant chemotherapy with {:[id number]::\"both ears\",\"left ear\",\"right ear\"} and reviewed pros and cons.I explained to him this would be the recommendation from sarcoma experts in this situation as well. He is not enthused about pursuing that route,  Recommend CT scans again in 3 months    2.?History of hemochromatosis incidentally noted on records- Rising %sat wthout intervention- recheck levels. Will request hemochromatosis panel and perhaps institute phlebotomy  3.  Age appropriate cancer screening- he is up-to-date    RTC  8 weeks      Code Status    Code Status: Prior                      I will START or STAY ON the medications listed below when I get home from the hospital:     mg oral tablet  -- 1 tab(s) by mouth every 6 hours, As Needed for pain  -- Do not take this drug if you are pregnant.  It is very important that you take or use this exactly as directed.  Do not skip doses or discontinue unless directed by your doctor.  May cause drowsiness or dizziness.  Obtain medical advice before taking any non-prescription drugs as some may affect the action of this medication.  Take with food or milk.      -- Indication: For pain as needed    dibucaine 1% topical ointment  -- 1 application on skin every 6 hours, As needed, Perineal discomfort  -- Indication: For perineum    Prenatal Multivitamins with Folic Acid 1 mg oral tablet  -- 1 tab(s) by mouth once a day  -- Indication: For if breastfeeding    Colace 100 mg oral capsule  -- 1 cap(s) by mouth 2 times a day   -- Medication should be taken with plenty of water.    -- Indication: For Stool softner

## 2021-09-25 NOTE — DISCHARGE NOTE OB - CARE PROVIDER_API CALL
Silver Miranda  OBSTETRICS AND GYNECOLOGY  [address] [id number]  Phone: [phone]  Fax: [phone]  Follow Up Time: 1 month

## 2021-09-25 NOTE — PROGRESS NOTE ADULT - SUBJECTIVE AND OBJECTIVE BOX
OBGYN PA NOTE PPD1  Patient seen and evaluated at bedside,  resting comfortably w/o any acute  complaints.  Denies fever, HA, CP, SOB, N/V/D, dizziness, palpitations, worsening abdominal pain, worsening vaginal bleeding, or any other concerns.  Ambulating and voiding without difficulty.  Passing flatus and tolerating regular diet.  Attempting to breastfeed.     Vital Signs Last 24 Hrs  T(C): 36.4 (25 Sep 2021 05:33), Max: 36.8 (24 Sep 2021 18:09)  T(F): 97.5 (25 Sep 2021 05:33), Max: 98.2 (24 Sep 2021 18:09)  HR: 84 (25 Sep 2021 05:33) (74 - 120)  BP: 115/68 (25 Sep 2021 05:33) (115/68 - 142/67)  BP(mean): 85 (24 Sep 2021 17:10) (85 - 98)  RR: 16 (25 Sep 2021 05:33) (14 - 18)  SpO2: 97% (25 Sep 2021 05:33) (95% - 98%)    Physical Exam:     Gen: A&Ox 3, NAD  Chest: CTAB/L  Cardiac: S1+S2+ RRR  Breast: Soft, nontender, nonengorged  Abdomen: Soft, nontender, Fundus firm below umbilicus, +BS  Gyn: Mild lochia, repaired  Extremities: Nontender, DTRS 2+, no worsening edema                          9.9    16.58 )-----------( 149      ( 25 Sep 2021 06:54 )             30.3

## 2021-09-25 NOTE — DISCHARGE NOTE OB - CARE PLAN
Principal Discharge DX:	 (normal spontaneous vaginal delivery)  Assessment and plan of treatment:	follow up with own ob/gyn in 5-6 weeks. no sex, pelvic rest, no heavy lifting   1

## 2021-09-25 NOTE — DISCHARGE NOTE OB - PATIENT PORTAL LINK FT
You can access the FollowMyHealth Patient Portal offered by [facility] by registering at the following website: [email or web address]. By joining [employer]’s FollowMyHealth portal, you will also be able to view your health information using other applications (apps) compatible with our system.

## 2021-09-26 VITALS
SYSTOLIC BLOOD PRESSURE: 124 MMHG | DIASTOLIC BLOOD PRESSURE: 72 MMHG | OXYGEN SATURATION: 96 % | RESPIRATION RATE: 16 BRPM | TEMPERATURE: 98 F | HEART RATE: 62 BPM

## 2021-09-26 PROCEDURE — 59050 FETAL MONITOR W/REPORT: CPT

## 2021-09-26 PROCEDURE — 86901 BLOOD TYPING SEROLOGIC RH(D): CPT

## 2021-09-26 PROCEDURE — 85610 PROTHROMBIN TIME: CPT

## 2021-09-26 PROCEDURE — G0463: CPT

## 2021-09-26 PROCEDURE — 36415 COLL VENOUS BLD VENIPUNCTURE: CPT

## 2021-09-26 PROCEDURE — 86870 RBC ANTIBODY IDENTIFICATION: CPT

## 2021-09-26 PROCEDURE — 59025 FETAL NON-STRESS TEST: CPT

## 2021-09-26 PROCEDURE — 86780 TREPONEMA PALLIDUM: CPT

## 2021-09-26 PROCEDURE — 85730 THROMBOPLASTIN TIME PARTIAL: CPT

## 2021-09-26 PROCEDURE — 86900 BLOOD TYPING SEROLOGIC ABO: CPT

## 2021-09-26 PROCEDURE — 85025 COMPLETE CBC W/AUTO DIFF WBC: CPT

## 2021-09-26 PROCEDURE — 85384 FIBRINOGEN ACTIVITY: CPT

## 2021-09-26 PROCEDURE — 85461 HEMOGLOBIN FETAL: CPT

## 2021-09-26 PROCEDURE — 87635 SARS-COV-2 COVID-19 AMP PRB: CPT

## 2021-09-26 PROCEDURE — 86850 RBC ANTIBODY SCREEN: CPT

## 2021-09-26 RX ORDER — DOCUSATE SODIUM 100 MG
1 CAPSULE ORAL
Qty: 28 | Refills: 0
Start: 2021-09-26 | End: 2021-10-09

## 2021-09-26 RX ORDER — IBUPROFEN 200 MG
1 TABLET ORAL
Qty: 12 | Refills: 0
Start: 2021-09-26 | End: 2021-09-28

## 2021-09-26 RX ORDER — DIBUCAINE 1 %
1 OINTMENT (GRAM) RECTAL
Qty: 10 | Refills: 0
Start: 2021-09-26 | End: 2021-09-28

## 2021-09-26 RX ADMIN — Medication 600 MILLIGRAM(S): at 06:01

## 2021-09-26 RX ADMIN — Medication 600 MILLIGRAM(S): at 07:00

## 2021-09-26 NOTE — PROGRESS NOTE ADULT - PROBLEM SELECTOR PLAN 1
A/P:  PPD#2 s/p ; mild pp anemia asym    - Discharge home with instructions  -Follow up in office in 5-6 weeks for postpartum visit  -Breastfeeding encouraged   -d/w dr.gaweda interiano

## 2021-09-26 NOTE — PROGRESS NOTE ADULT - ASSESSMENT
A/P:  PPD#2 s/p ; mild pp anemia asym    - Discharge home with instructions  -Follow up in office in 5-6 weeks for postpartum visit  -Breastfeeding encouraged   -d/w dr.gaweda interiano  
A/P:  25y F Postpartum day #1 s/p  @ 40 weeks, currently in stable condition  -Continue pain management  -Encourage OOB and ambulation  -Continue post partum care  -Plan for discharge tomorrow.    DR Miranda aware

## 2021-09-26 NOTE — PROGRESS NOTE ADULT - SUBJECTIVE AND OBJECTIVE BOX
Patient seen at bedside resting comfortably offers no current complaints.  Ambulating and voiding without difficulty.  Passing flatus and tolerating regular diet.  both breast/bottle feeding.  Denies HA, CP, SOB, N/V/D, dizziness, palpitations, worsening abdominal pain, worsening vaginal bleeding, or any other concerns.      Vital Signs Last 24 Hrs  T(C): 36.6 (26 Sep 2021 06:00), Max: 36.6 (26 Sep 2021 06:00)  T(F): 97.9 (26 Sep 2021 06:00), Max: 97.9 (26 Sep 2021 06:00)  HR: 62 (26 Sep 2021 06:00) (62 - 76)  BP: 124/72 (26 Sep 2021 06:00) (124/72 - 130/77)  BP(mean): --  RR: 16 (26 Sep 2021 06:00) (16 - 16)  SpO2: 96% (26 Sep 2021 06:00) (96% - 98%)    Physical Exam:     Gen: A&Ox 3, NAD  Chest: CTA B/L  Cardiac: S1,S2; RRR  Breast: Soft, nontender, nonengorged  Abdomen: +BS; Soft, nontender, ND; Fundus firm below umbilicus  Gyn: Min lochia  Ext: Nontender, DTRS 2+, no worsening edema                          9.9    16.58 )-----------( 149      ( 25 Sep 2021 06:54 )             30.3        A/P:  PPD#2 s/p ; mild pp anemia asym    - Discharge home with instructions  -Follow up in office in 5-6 weeks for postpartum visit  -Breastfeeding encouraged   -d/w dr.gaweda interiano

## 2021-09-28 LAB — ABO RH CONFIRMATION: SIGNIFICANT CHANGE UP

## 2022-12-15 ENCOUNTER — APPOINTMENT (OUTPATIENT)
Dept: ANTEPARTUM | Facility: CLINIC | Age: 27
End: 2022-12-15
Payer: MEDICAID

## 2022-12-15 ENCOUNTER — ASOB RESULT (OUTPATIENT)
Age: 27
End: 2022-12-15

## 2022-12-15 PROCEDURE — 76813 OB US NUCHAL MEAS 1 GEST: CPT | Mod: 59

## 2022-12-15 PROCEDURE — 76801 OB US < 14 WKS SINGLE FETUS: CPT

## 2023-01-20 NOTE — PATIENT PROFILE OB - BABY A: MECONIUM PRESENT, DELIVERY
"Writer spoke with patient and scheduled initial psychiatry appointment for 01/26/2023 @ 12:30 pm with Regina Fitzpatrick. Tracker completed.    Aline Early  01/20/2023  1254    ----- Message from Mirza Waldrop RN sent at 1/20/2023 12:17 PM CST -----  Regarding: FW: TC Referral   Mental Health &Addiction (MH&A)Transition Clinic (TC):     Provides Patient Support While Waiting to Access Programmatic and Outpatient MH&A Care and Provides Select Crisis Assessment Services     NURSING Referral Review  _________________________________________    This RN has reviewed this Medication Management referral to the Transition Clinic and deemed the referral    Appropriate x   Inappropriate   Consulting     Based on the following criteria:    Pt has a psychiatric provider (or pending plan) in place for future prescribing: Yes:     Pt is scheduled for next LOC:   Provider(s)Liam Rose  MSN  CNP,RN   Location [facility]  [address] [place] [address] [place], [address]   Date/Time 2/9/2023 8:30 am       Timeframe until pt's scheduled psychiatry appointment is less than 6 months: Yes: 20 days     Pt takes psychiatric medications: No     Pt's goals seem to align with this temporary service: Yes: Transition Clinic to bridge psychiatric care and psychiatric medication management until next Level of Care.        Any additional pertinent information regarding this referral: Patient presented to the ED on 1/18/23. Per ED HPI. Kelli Mcnally \"Roman\" is a 17 year old adult (they/them pronouns)who presents with mother for mental health evaluation.  They say they have hx of depression, anxiety, BPD. The patient is having suicidal thoughts with plan and intent.  Earlier today they took 4 tylenol pills.  They were intending to take the whole bottle but stopped because they got \"freaked out\" and called their mom who brought them here.  They are now thinking about turning on the car while in the garage to kill themself.  They have one prior "
How Severe Is Your Skin Lesion?: mild
"suicide attempt in 2019 where they used a weight while they were in the bathtub.  They feel that parents have tried to deal with their mental health without seeing providers and doing \"meditation.\"  No prior admissions.  They saw a therapist occasionally or school counselor in the past.  They cut on both arms and last cut on left forearm 3 days ago.  They feel like they can be safe while waiting in the ED but not if they go home. They say the holidays were hard this past year and they have been feeling suicidal since New Years.  They also had a recent best friend break up. They took risperidone and prozac in the past and quit about 1 year ago. They felt it didn't help them.    Initial contact w/ patient/parent: TC Coordinator to contact this patient/patients guardian to schedule a New Person Visit with TC Provider Regina Fitzpatrick.      Additional Scheduling Instructions for Transition Clinic Coordinator:     TC Coordinators:  This is a Medication management only Referral.        Please call the patient at [phone]. Please schedule a NewPerson appointment with TC Provider Regina Fitzpatrick as soon as possible or as indicated by the patient.      TC Coordinator, please educate this (patient/ parent/guardian/facility staff member) as to the purpose and benefit of the TC.      The Transition Clinic is a Temporary Service that helps to bridge the time to your next appointment.  It is not intended to be a long-term service and you are expected to attend your scheduled appointment with your next provider.      Patient/Parent/ Facility Staff Member verbalized understanding     If you need support between appointments, please call [phone] and let them know you're seen by Transition Clinic Psychiatry.  You may also send a [employer] message to reach us.         RN Signature Mirza Waldrop RN on 1/20/2023 at 11:44 AM       FW: TC Referral  Received: Yesterday  Evan Stuart Transition Clinic Rn Pool  Hello, "
Has Your Skin Lesion Been Treated?: not been treated
  Med management referral. Pt is scheduled for next LOC:   Provider(s)Liam PLASCENCIA CNP,RN   Location [facility]  [address], [address]   Date/Time 2/9/2023 8:30 am   -Evan         Previous Messages     ----- Message -----   From: Maria C Stubbs   Sent: 1/19/2023   4:59 PM CST   To: Transition Clinic, Transition Clinic Rn Agustin   Subject: TC Referral                                       Transition Clinic Referral   [place]         Please Check Type of Referral Requested:       ____THERAPY: The Transition clinic is able to schedule patients without current medical insurance; these patient will be referred to our Social Work Care Coordinator for Medical Insurance              Assistance. We are open for referral for psychotherapy. Patient is referred from:  Extended Care       _X___MEDICATION:  Referrals for Medication are ONLY accepted from the following areas (select): Emergency Department/Urgent Care - HIGH PRIORITY                                       Suboxone and Opioid Management Referrals are automatically denied. TC Psychiatry cannot see patient without active medical insurance.       GUARDIAN: If your patient is not their own Guardian, please provide the following:     Guardian Name:   Guardian Contact Information (Phone & Email) :   Guardian Address:     FOSTER CARE PROVIDER: If your patient lives at a Licensed Foster Care, please provide the following:     Foster Provider:   Foster Provider Contact Information (Phone & Email):   Foster Provider Address:         Referring Provider Contact Name: Alis Strong; Phone Number: [phone]     Reason for Transition Clinic Referral: Bridge until seen by     Next Level of Care Patient Will Be Transitioned To: Med Management   Provider(s)Liam PLASCENCIA CNP,RN   [facility]  [address], [address]   Date/Time 2/9/2023 8:30 am     What Would Be Helpful from the Transition Clinic: N/A 
Is This A New Presentation, Or A Follow-Up?: Skin Lesion
     Needs: NO     Does Patient Have Access to Technology: YES     Patient E-mail Address:  [email or web address]     Current Patient Phone Number: [phone]     Clinician Gender Preference (if applicable): NO     Patient location preference: In person     Maria C Stubbs                              ----- Message -----  From: Evan Stuart  Sent: 1/19/2023   5:07 PM CST  To: Transition Clinic Rn Pool  Subject: FW: TC Referral                                  Hello,   Med management referral. Pt is scheduled for next LOC:   Provider(s)Liam Rose  MSN  CNP,RN   Location [facility]  [address] [place] [address] [place], [address]   Date/Time 2/9/2023 8:30 am   -Evan    ----- Message -----  From: Maria C Stubbs  Sent: 1/19/2023   4:59 PM CST  To: Transition Clinic, Transition Clinic Rn Pool  Subject: TC Referral                                      Transition Clinic Referral   [place]         Please Check Type of Referral Requested:       ____THERAPY: The Transition clinic is able to schedule patients without current medical insurance; these patient will be referred to our Social Work Care Coordinator for Medical Insurance              Assistance. We are open for referral for psychotherapy. Patient is referred from:  Extended Care      _X___MEDICATION:  Referrals for Medication are ONLY accepted from the following areas (select): Emergency Department/Urgent Care - HIGH PRIORITY                                       Suboxone and Opioid Management Referrals are automatically denied. TC Psychiatry cannot see patient without active medical insurance.       GUARDIAN: If your patient is not their own Guardian, please provide the following:    Guardian Name:  Guardian Contact Information (Phone & Email) :  Guardian Address:     FOSTER CARE PROVIDER: If your patient lives at a Licensed Foster Care, please provide the following:    Foster Provider:  Foster Provider Contact Information (Phone & 
Email):  [place] Provider Address:         Referring Provider Contact Name: Alis Strong; Phone Number: [phone]    Reason for Transition Clinic Referral: Bridge until seen by     Next Level of Care Patient Will Be Transitioned To: Med Management  Provider(s)Liam Rose  MSN  CNP,RN  Location [facility]  [address] [place] [address], [address]  Date/Time 2/9/2023 8:30 am    What Would Be Helpful from the Transition Clinic: N/A     Needs: NO    Does Patient Have Access to Technology: YES    Patient E-mail Address:  [email or web address]    Current Patient Phone Number: [phone]    Clinician Gender Preference (if applicable): NO    Patient location preference: In person    Maria C Stubbs                        
no

## 2023-02-09 ENCOUNTER — APPOINTMENT (OUTPATIENT)
Dept: ANTEPARTUM | Facility: CLINIC | Age: 28
End: 2023-02-09
Payer: MEDICAID

## 2023-02-09 ENCOUNTER — NON-APPOINTMENT (OUTPATIENT)
Age: 28
End: 2023-02-09

## 2023-02-09 ENCOUNTER — ASOB RESULT (OUTPATIENT)
Age: 28
End: 2023-02-09

## 2023-02-09 PROCEDURE — 99212 OFFICE O/P EST SF 10 MIN: CPT | Mod: 25

## 2023-02-09 PROCEDURE — 76811 OB US DETAILED SNGL FETUS: CPT

## 2023-04-07 ENCOUNTER — APPOINTMENT (OUTPATIENT)
Dept: ANTEPARTUM | Facility: CLINIC | Age: 28
End: 2023-04-07
Payer: MEDICAID

## 2023-04-07 ENCOUNTER — ASOB RESULT (OUTPATIENT)
Age: 28
End: 2023-04-07

## 2023-04-07 PROCEDURE — 76816 OB US FOLLOW-UP PER FETUS: CPT

## 2023-05-19 ENCOUNTER — APPOINTMENT (OUTPATIENT)
Dept: ANTEPARTUM | Facility: CLINIC | Age: 28
End: 2023-05-19
Payer: MEDICAID

## 2023-05-19 ENCOUNTER — ASOB RESULT (OUTPATIENT)
Age: 28
End: 2023-05-19

## 2023-05-19 PROCEDURE — 76816 OB US FOLLOW-UP PER FETUS: CPT

## 2023-05-19 PROCEDURE — 76819 FETAL BIOPHYS PROFIL W/O NST: CPT | Mod: 59

## 2023-06-16 ENCOUNTER — APPOINTMENT (OUTPATIENT)
Dept: ANTEPARTUM | Facility: CLINIC | Age: 28
End: 2023-06-16
Payer: MEDICAID

## 2023-06-16 ENCOUNTER — ASOB RESULT (OUTPATIENT)
Age: 28
End: 2023-06-16

## 2023-06-16 PROCEDURE — 76816 OB US FOLLOW-UP PER FETUS: CPT

## 2023-06-16 PROCEDURE — 76819 FETAL BIOPHYS PROFIL W/O NST: CPT | Mod: 59

## 2023-06-19 ENCOUNTER — INPATIENT (INPATIENT)
Facility: HOSPITAL | Age: 28
LOS: 1 days | Discharge: HOME | End: 2023-06-21
Attending: OBSTETRICS & GYNECOLOGY | Admitting: OBSTETRICS & GYNECOLOGY
Payer: MEDICAID

## 2023-06-19 ENCOUNTER — APPOINTMENT (OUTPATIENT)
Dept: ANTEPARTUM | Facility: CLINIC | Age: 28
End: 2023-06-19
Payer: MEDICAID

## 2023-06-19 ENCOUNTER — ASOB RESULT (OUTPATIENT)
Age: 28
End: 2023-06-19

## 2023-06-19 VITALS — HEIGHT: 65 IN | WEIGHT: 182.98 LBS

## 2023-06-19 DIAGNOSIS — Z3A.00 WEEKS OF GESTATION OF PREGNANCY NOT SPECIFIED: ICD-10-CM

## 2023-06-19 DIAGNOSIS — O26.899 OTHER SPECIFIED PREGNANCY RELATED CONDITIONS, UNSPECIFIED TRIMESTER: ICD-10-CM

## 2023-06-19 DIAGNOSIS — Z34.80 ENCOUNTER FOR SUPERVISION OF OTHER NORMAL PREGNANCY, UNSPECIFIED TRIMESTER: ICD-10-CM

## 2023-06-19 LAB
APTT BLD: 29.4 SEC — SIGNIFICANT CHANGE UP (ref 27.5–35.5)
BASOPHILS # BLD AUTO: 0.02 K/UL — SIGNIFICANT CHANGE UP (ref 0–0.2)
BASOPHILS NFR BLD AUTO: 0.2 % — SIGNIFICANT CHANGE UP (ref 0–2)
EOSINOPHIL # BLD AUTO: 0.01 K/UL — SIGNIFICANT CHANGE UP (ref 0–0.5)
EOSINOPHIL NFR BLD AUTO: 0.1 % — SIGNIFICANT CHANGE UP (ref 0–6)
HCT VFR BLD CALC: 34 % — LOW (ref 34.5–45)
HGB BLD-MCNC: 11 G/DL — LOW (ref 11.5–15.5)
IMM GRANULOCYTES NFR BLD AUTO: 0.7 % — SIGNIFICANT CHANGE UP (ref 0–0.9)
INR BLD: 0.91 RATIO — SIGNIFICANT CHANGE UP (ref 0.88–1.16)
LYMPHOCYTES # BLD AUTO: 1.09 K/UL — SIGNIFICANT CHANGE UP (ref 1–3.3)
LYMPHOCYTES # BLD AUTO: 12.3 % — LOW (ref 13–44)
MCHC RBC-ENTMCNC: 26.6 PG — LOW (ref 27–34)
MCHC RBC-ENTMCNC: 32.4 GM/DL — SIGNIFICANT CHANGE UP (ref 32–36)
MCV RBC AUTO: 82.1 FL — SIGNIFICANT CHANGE UP (ref 80–100)
MONOCYTES # BLD AUTO: 0.46 K/UL — SIGNIFICANT CHANGE UP (ref 0–0.9)
MONOCYTES NFR BLD AUTO: 5.2 % — SIGNIFICANT CHANGE UP (ref 2–14)
NEUTROPHILS # BLD AUTO: 7.21 K/UL — SIGNIFICANT CHANGE UP (ref 1.8–7.4)
NEUTROPHILS NFR BLD AUTO: 81.5 % — HIGH (ref 43–77)
NRBC # BLD: 0 /100 WBCS — SIGNIFICANT CHANGE UP (ref 0–0)
PLATELET # BLD AUTO: 188 K/UL — SIGNIFICANT CHANGE UP (ref 150–400)
PROTHROM AB SERPL-ACNC: 10.8 SEC — SIGNIFICANT CHANGE UP (ref 10.5–13.4)
RBC # BLD: 4.14 M/UL — SIGNIFICANT CHANGE UP (ref 3.8–5.2)
RBC # FLD: 15.4 % — HIGH (ref 10.3–14.5)
WBC # BLD: 8.85 K/UL — SIGNIFICANT CHANGE UP (ref 3.8–10.5)
WBC # FLD AUTO: 8.85 K/UL — SIGNIFICANT CHANGE UP (ref 3.8–10.5)

## 2023-06-19 PROCEDURE — 76818 FETAL BIOPHYS PROFILE W/NST: CPT

## 2023-06-19 PROCEDURE — ZZZZZ: CPT

## 2023-06-19 RX ORDER — DIPHENHYDRAMINE HCL 50 MG
25 CAPSULE ORAL EVERY 6 HOURS
Refills: 0 | Status: DISCONTINUED | OUTPATIENT
Start: 2023-06-19 | End: 2023-06-21

## 2023-06-19 RX ORDER — SODIUM CHLORIDE 9 MG/ML
3 INJECTION INTRAMUSCULAR; INTRAVENOUS; SUBCUTANEOUS EVERY 8 HOURS
Refills: 0 | Status: DISCONTINUED | OUTPATIENT
Start: 2023-06-19 | End: 2023-06-21

## 2023-06-19 RX ORDER — TETANUS TOXOID, REDUCED DIPHTHERIA TOXOID AND ACELLULAR PERTUSSIS VACCINE, ADSORBED 5; 2.5; 8; 8; 2.5 [IU]/.5ML; [IU]/.5ML; UG/.5ML; UG/.5ML; UG/.5ML
0.5 SUSPENSION INTRAMUSCULAR ONCE
Refills: 0 | Status: DISCONTINUED | OUTPATIENT
Start: 2023-06-19 | End: 2023-06-21

## 2023-06-19 RX ORDER — AER TRAVELER 0.5 G/1
1 SOLUTION RECTAL; TOPICAL EVERY 4 HOURS
Refills: 0 | Status: DISCONTINUED | OUTPATIENT
Start: 2023-06-19 | End: 2023-06-21

## 2023-06-19 RX ORDER — DIBUCAINE 1 %
1 OINTMENT (GRAM) RECTAL EVERY 6 HOURS
Refills: 0 | Status: DISCONTINUED | OUTPATIENT
Start: 2023-06-19 | End: 2023-06-21

## 2023-06-19 RX ORDER — OXYTOCIN 10 UNIT/ML
41.67 VIAL (ML) INJECTION
Qty: 20 | Refills: 0 | Status: DISCONTINUED | OUTPATIENT
Start: 2023-06-19 | End: 2023-06-21

## 2023-06-19 RX ORDER — OXYCODONE HYDROCHLORIDE 5 MG/1
5 TABLET ORAL
Refills: 0 | Status: DISCONTINUED | OUTPATIENT
Start: 2023-06-19 | End: 2023-06-21

## 2023-06-19 RX ORDER — ACETAMINOPHEN 500 MG
975 TABLET ORAL
Refills: 0 | Status: DISCONTINUED | OUTPATIENT
Start: 2023-06-19 | End: 2023-06-21

## 2023-06-19 RX ORDER — IBUPROFEN 200 MG
600 TABLET ORAL EVERY 6 HOURS
Refills: 0 | Status: DISCONTINUED | OUTPATIENT
Start: 2023-06-19 | End: 2023-06-21

## 2023-06-19 RX ORDER — MAGNESIUM HYDROXIDE 400 MG/1
30 TABLET, CHEWABLE ORAL
Refills: 0 | Status: DISCONTINUED | OUTPATIENT
Start: 2023-06-19 | End: 2023-06-21

## 2023-06-19 RX ORDER — CHLORHEXIDINE GLUCONATE 213 G/1000ML
1 SOLUTION TOPICAL DAILY
Refills: 0 | Status: DISCONTINUED | OUTPATIENT
Start: 2023-06-19 | End: 2023-06-19

## 2023-06-19 RX ORDER — SIMETHICONE 80 MG/1
80 TABLET, CHEWABLE ORAL EVERY 4 HOURS
Refills: 0 | Status: DISCONTINUED | OUTPATIENT
Start: 2023-06-19 | End: 2023-06-21

## 2023-06-19 RX ORDER — SODIUM CHLORIDE 9 MG/ML
1000 INJECTION, SOLUTION INTRAVENOUS
Refills: 0 | Status: DISCONTINUED | OUTPATIENT
Start: 2023-06-19 | End: 2023-06-19

## 2023-06-19 RX ORDER — BENZOCAINE 10 %
1 GEL (GRAM) MUCOUS MEMBRANE EVERY 6 HOURS
Refills: 0 | Status: DISCONTINUED | OUTPATIENT
Start: 2023-06-19 | End: 2023-06-21

## 2023-06-19 RX ORDER — HYDROCORTISONE 1 %
1 OINTMENT (GRAM) TOPICAL EVERY 6 HOURS
Refills: 0 | Status: DISCONTINUED | OUTPATIENT
Start: 2023-06-19 | End: 2023-06-21

## 2023-06-19 RX ORDER — KETOROLAC TROMETHAMINE 30 MG/ML
30 SYRINGE (ML) INJECTION ONCE
Refills: 0 | Status: DISCONTINUED | OUTPATIENT
Start: 2023-06-19 | End: 2023-06-21

## 2023-06-19 RX ORDER — OXYTOCIN 10 UNIT/ML
2 VIAL (ML) INJECTION
Qty: 30 | Refills: 0 | Status: DISCONTINUED | OUTPATIENT
Start: 2023-06-19 | End: 2023-06-19

## 2023-06-19 RX ORDER — LANOLIN
1 OINTMENT (GRAM) TOPICAL EVERY 6 HOURS
Refills: 0 | Status: DISCONTINUED | OUTPATIENT
Start: 2023-06-19 | End: 2023-06-21

## 2023-06-19 RX ORDER — PRAMOXINE HYDROCHLORIDE 150 MG/15G
1 AEROSOL, FOAM RECTAL EVERY 4 HOURS
Refills: 0 | Status: DISCONTINUED | OUTPATIENT
Start: 2023-06-19 | End: 2023-06-21

## 2023-06-19 RX ADMIN — Medication 2 MILLIUNIT(S)/MIN: at 12:25

## 2023-06-19 RX ADMIN — Medication 975 MILLIGRAM(S): at 22:30

## 2023-06-19 RX ADMIN — SODIUM CHLORIDE 3 MILLILITER(S): 9 INJECTION INTRAMUSCULAR; INTRAVENOUS; SUBCUTANEOUS at 22:18

## 2023-06-19 RX ADMIN — Medication 975 MILLIGRAM(S): at 21:23

## 2023-06-20 LAB
ABO RH CONFIRMATION: SIGNIFICANT CHANGE UP
FETAL SCREEN: SIGNIFICANT CHANGE UP
HCT VFR BLD CALC: 30.8 % — LOW (ref 34.5–45)
HGB BLD-MCNC: 10.1 G/DL — LOW (ref 11.5–15.5)
MCHC RBC-ENTMCNC: 27.2 PG — SIGNIFICANT CHANGE UP (ref 27–34)
MCHC RBC-ENTMCNC: 32.8 GM/DL — SIGNIFICANT CHANGE UP (ref 32–36)
MCV RBC AUTO: 83 FL — SIGNIFICANT CHANGE UP (ref 80–100)
NRBC # BLD: 0 /100 WBCS — SIGNIFICANT CHANGE UP (ref 0–0)
PLATELET # BLD AUTO: 168 K/UL — SIGNIFICANT CHANGE UP (ref 150–400)
RBC # BLD: 3.71 M/UL — LOW (ref 3.8–5.2)
RBC # FLD: 15.4 % — HIGH (ref 10.3–14.5)
T PALLIDUM AB TITR SER: NEGATIVE — SIGNIFICANT CHANGE UP
WBC # BLD: 9.59 K/UL — SIGNIFICANT CHANGE UP (ref 3.8–10.5)
WBC # FLD AUTO: 9.59 K/UL — SIGNIFICANT CHANGE UP (ref 3.8–10.5)

## 2023-06-20 RX ORDER — SENNOSIDES/DOCUSATE SODIUM 8.6MG-50MG
1 TABLET ORAL
Qty: 60 | Refills: 0
Start: 2023-06-20 | End: 2023-07-19

## 2023-06-20 RX ORDER — IBUPROFEN 200 MG
1 TABLET ORAL
Qty: 60 | Refills: 0
Start: 2023-06-20 | End: 2023-07-04

## 2023-06-20 RX ADMIN — SODIUM CHLORIDE 3 MILLILITER(S): 9 INJECTION INTRAMUSCULAR; INTRAVENOUS; SUBCUTANEOUS at 14:29

## 2023-06-20 RX ADMIN — Medication 1 TABLET(S): at 12:38

## 2023-06-20 RX ADMIN — SODIUM CHLORIDE 3 MILLILITER(S): 9 INJECTION INTRAMUSCULAR; INTRAVENOUS; SUBCUTANEOUS at 05:53

## 2023-06-20 RX ADMIN — Medication 975 MILLIGRAM(S): at 03:13

## 2023-06-20 RX ADMIN — Medication 975 MILLIGRAM(S): at 21:16

## 2023-06-20 RX ADMIN — Medication 975 MILLIGRAM(S): at 04:53

## 2023-06-20 RX ADMIN — SODIUM CHLORIDE 3 MILLILITER(S): 9 INJECTION INTRAMUSCULAR; INTRAVENOUS; SUBCUTANEOUS at 21:27

## 2023-06-20 RX ADMIN — Medication 975 MILLIGRAM(S): at 22:15

## 2023-06-21 ENCOUNTER — TRANSCRIPTION ENCOUNTER (OUTPATIENT)
Age: 28
End: 2023-06-21

## 2023-06-21 VITALS
RESPIRATION RATE: 18 BRPM | DIASTOLIC BLOOD PRESSURE: 66 MMHG | HEART RATE: 64 BPM | TEMPERATURE: 98 F | OXYGEN SATURATION: 97 % | SYSTOLIC BLOOD PRESSURE: 109 MMHG

## 2023-06-21 PROCEDURE — 85461 HEMOGLOBIN FETAL: CPT

## 2023-06-21 PROCEDURE — 86850 RBC ANTIBODY SCREEN: CPT

## 2023-06-21 PROCEDURE — 85610 PROTHROMBIN TIME: CPT

## 2023-06-21 PROCEDURE — 36415 COLL VENOUS BLD VENIPUNCTURE: CPT

## 2023-06-21 PROCEDURE — 59050 FETAL MONITOR W/REPORT: CPT

## 2023-06-21 PROCEDURE — G0463: CPT

## 2023-06-21 PROCEDURE — 85025 COMPLETE CBC W/AUTO DIFF WBC: CPT

## 2023-06-21 PROCEDURE — 59025 FETAL NON-STRESS TEST: CPT

## 2023-06-21 PROCEDURE — 85730 THROMBOPLASTIN TIME PARTIAL: CPT

## 2023-06-21 PROCEDURE — 86900 BLOOD TYPING SEROLOGIC ABO: CPT

## 2023-06-21 PROCEDURE — 85027 COMPLETE CBC AUTOMATED: CPT

## 2023-06-21 PROCEDURE — 85384 FIBRINOGEN ACTIVITY: CPT

## 2023-06-21 PROCEDURE — 86901 BLOOD TYPING SEROLOGIC RH(D): CPT

## 2023-06-21 PROCEDURE — 86870 RBC ANTIBODY IDENTIFICATION: CPT

## 2023-06-21 PROCEDURE — 86780 TREPONEMA PALLIDUM: CPT

## 2023-06-21 RX ADMIN — Medication 975 MILLIGRAM(S): at 03:43

## 2023-06-21 RX ADMIN — Medication 1 TABLET(S): at 12:17

## 2023-06-21 RX ADMIN — SODIUM CHLORIDE 3 MILLILITER(S): 9 INJECTION INTRAMUSCULAR; INTRAVENOUS; SUBCUTANEOUS at 05:18

## 2023-06-21 RX ADMIN — Medication 975 MILLIGRAM(S): at 04:30

## 2023-06-21 NOTE — PROGRESS NOTE ADULT - ASSESSMENT
A/P:  PPD#1 s/p     -Continue pain management  -Encourage OOB and ambulation  -Check CBC  -Continue current care  -Plan for discharge tomorrow  -d/w dr. jessica interiano  
A/P:  PPD#2 s/p       - Discharge home with instructions  -Follow up in office in 5-6 weeks for postpartum visit  -Breastfeeding encouraged   -d/w dr.gaweda interiano

## 2023-06-21 NOTE — DISCHARGE NOTE OB - HAVE YOU RECEIVED AT LEAST TWO PFIZER AND/OR MODERNA VACCINATIONS (IN ANY COMBINATION) AND/OR ONE JOHNSON & JOHNSON VACCINATION?
Oncology Nurse Navigator (ONN) Kenia Birch reached out to patient to follow up on new referral.  No answer.  ONN left voice message with my contact information requesting a call back at patient's convenience.        
Yes

## 2023-06-21 NOTE — DISCHARGE NOTE OB - CARE PROVIDER_API CALL
Silver Miranda  Obstetrics and Gynecology  [address] [id number]  Phone: [phone]  Fax: [phone]  Follow Up Time: 1 month

## 2023-06-21 NOTE — PROGRESS NOTE ADULT - SUBJECTIVE AND OBJECTIVE BOX
Patient seen at bedside resting comfortably offers no current complaints.  Ambulating and voiding without difficulty.  Passing flatus and tolerating regular diet.  both breast/bottle feeding.  Denies HA, CP, SOB, N/V/D, dizziness, palpitations, worsening abdominal pain, worsening vaginal bleeding, or any other concerns.      Vital Signs Last 24 Hrs  T(C): 36.7 (2023 05:20), Max: 37.2 (2023 19:25)  T(F): 98 (2023 05:20), Max: 98.9 (2023 19:25)  HR: 64 (2023 05:20) (64 - 88)  BP: 109/66 (2023 05:20) (98/59 - 109/66)  BP(mean): --  RR: 18 (2023 05:20) (18 - 18)  SpO2: 97% (2023 05:20) (97% - 98%)    Parameters below as of 2023 05:20  Patient On (Oxygen Delivery Method): room air        Physical Exam:     Gen: A&Ox 3, NAD  Chest: CTA B/L  Cardiac: S1,S2; RRR  Breast: Soft, nontender, nonengorged  Abdomen: +BS; Soft, nontender, ND; Fundus firm below umbilicus  Gyn: Min lochia  Ext: Nontender, DTRS 2+, no worsening edema                          10.1   9.59  )-----------( 168      ( 2023 06:00 )             30.8        A/P:  PPD#2 s/p       - Discharge home with instructions  -Follow up in office in 5-6 weeks for postpartum visit  -Breastfeeding encouraged   -d/w dr.gaweda interiano
Patient seen at bedside resting comfortably offers no current complaints. Ambulating and voiding without difficulty.  Passing flatus and tolerating regular diet.  both breast/bottle feeding . Denies HA, CP, SOB, N/V/D, dizziness, palpitations, worsening abdominal pain, worsening vaginal bleeding, or any other concerns.    Vital Signs Last 24 Hrs  T(C): 36.6 (2023 06:10), Max: 37.2 (2023 21:15)  T(F): 97.9 (2023 06:10), Max: 98.9 (2023 21:15)  HR: 73 (2023 06:10) (59 - 83)  BP: 111/73 (2023 06:10) (102/56 - 127/76)  BP(mean): 83 (2023 20:51) (76 - 83)  RR: 17 (2023 06:10) (16 - 20)  SpO2: 99% (2023 06:10) (97% - 99%)    Parameters below as of 2023 06:10  Patient On (Oxygen Delivery Method): room air        Physical Exam:     Gen: A&Ox 3, NAD  Chest: CTA B/L  Cardiac: S1,S2; RRR  Breast: Soft, nontender, nonengorged  Abdomen: +BS, Soft, nontender,  ND; Fundus firm below umbilicus  Gyn: mod lochia, intact/repaired  Ext: Nontender, DTRS 2+, no worsening edema                          10.1   9.59  )-----------( 168      ( 2023 06:00 )             30.8       A/P:  PPD#1 s/p     -Continue pain management  -Encourage OOB and ambulation  -Check CBC  -Continue current care  -Plan for discharge tomorrow  -d/w dr. jessica interiano

## 2023-06-21 NOTE — PROGRESS NOTE ADULT - REASON FOR ADMISSION
miol for nonreassuring fetal heart rate siup at 40wks
27y.o f sip at 40wks for miol nonreassuring fetal heart rate

## 2023-06-21 NOTE — DISCHARGE NOTE OB - PATIENT PORTAL LINK FT
You can access the FollowMyHealth Patient Portal offered by [facility] by registering at the following website: [email or web address]. By joining [email or web address]’s FollowMyHealth portal, you will also be able to view your health information using other applications (apps) compatible with our system.

## 2023-06-21 NOTE — DISCHARGE NOTE OB - MEDICATION SUMMARY - MEDICATIONS TO TAKE
I will START or STAY ON the medications listed below when I get home from the hospital:     mg oral tablet  -- 1 tab(s) by mouth every 6 hours as needed for  moderate pain  -- Indication: For pain as needed    Prenatal Multivitamins with Folic Acid 1 mg oral tablet  -- 1 tab(s) by mouth once a day  -- Indication: For if breastfeeding    Colace 2-in-1 50 mg-8.6 mg oral tablet  -- 1 tab(s) by mouth 2 times a day  -- Indication: For Stool softner

## 2024-05-28 NOTE — PATIENT PROFILE OB - COMFORT LEVEL, ACCEPTABLE
----- Message from Cathryn Beaulieu sent at 5/28/2024 11:46 AM EDT -----  Regarding: Referral to Rheumatolgy  Contact: [phone]  I am having a lot of body pain and joint pain. I am not sure if it is menopause or arthritis. I would like to go to see Dr. Juarez in [facility] office. Let me know if I need an appointment for this.  Thank you   2 <<----- Click to add NO significant Past Surgical History

## 2025-07-01 NOTE — ED ADULT NURSE NOTE - GASTROINTESTINAL ASSESSMENT
Home Care is calling regarding an established patient with [facility].  Requesting orders from: Mikala Luna  PROVIDER AUTHORIZATION REQUIRED: RN unable to provide verbal approval for all orders.  See below for additional information.  RN will contact Home care with information after provider review.  Is this a request for a temporary pause in the home care episode?  No    Orders Requested    Skilled Nursing  Request for continuation of care with no increase or decrease in frequency  Frequency: every other week x 9 weeks  RN gave verbal order: Yes        Danna RN is requesting a change in weight parameters. Currently it is 235lbs-245lbs. Pt has some issues with fluid balance but also has eating disorder as well and pt current weight is 262lbs. RN does not feel this included excess water weight. Requesting new parameter of 258-268lbs.       Phone number Home Care can be reached at: [phone]  Okay to leave a detailed message?: Yes      Bam Merritt RN    - - -